# Patient Record
Sex: MALE | HISPANIC OR LATINO | URBAN - METROPOLITAN AREA
[De-identification: names, ages, dates, MRNs, and addresses within clinical notes are randomized per-mention and may not be internally consistent; named-entity substitution may affect disease eponyms.]

---

## 2021-11-23 ENCOUNTER — EMERGENCY (EMERGENCY)
Facility: HOSPITAL | Age: 46
LOS: 1 days | Discharge: HOME | End: 2021-11-23
Attending: EMERGENCY MEDICINE
Payer: MEDICARE

## 2021-11-23 VITALS
TEMPERATURE: 98 F | HEART RATE: 98 BPM | WEIGHT: 160.06 LBS | DIASTOLIC BLOOD PRESSURE: 90 MMHG | OXYGEN SATURATION: 98 % | HEIGHT: 64.96 IN | RESPIRATION RATE: 16 BRPM | SYSTOLIC BLOOD PRESSURE: 137 MMHG

## 2021-11-23 LAB
ALBUMIN SERPL ELPH-MCNC: 3.7 G/DL — SIGNIFICANT CHANGE UP (ref 3.5–5)
ALP SERPL-CCNC: 87 U/L — SIGNIFICANT CHANGE UP (ref 40–120)
ALT FLD-CCNC: 34 U/L DA — SIGNIFICANT CHANGE UP (ref 10–60)
ANION GAP SERPL CALC-SCNC: 7 MMOL/L — SIGNIFICANT CHANGE UP (ref 5–17)
AST SERPL-CCNC: 14 U/L — SIGNIFICANT CHANGE UP (ref 10–40)
BASOPHILS # BLD AUTO: 0.06 K/UL — SIGNIFICANT CHANGE UP (ref 0–0.2)
BASOPHILS NFR BLD AUTO: 0.7 % — SIGNIFICANT CHANGE UP (ref 0–2)
BILIRUB SERPL-MCNC: 0.2 MG/DL — SIGNIFICANT CHANGE UP (ref 0.2–1.2)
BUN SERPL-MCNC: 14 MG/DL — SIGNIFICANT CHANGE UP (ref 7–18)
CALCIUM SERPL-MCNC: 9.4 MG/DL — SIGNIFICANT CHANGE UP (ref 8.4–10.5)
CHLORIDE SERPL-SCNC: 105 MMOL/L — SIGNIFICANT CHANGE UP (ref 96–108)
CO2 SERPL-SCNC: 28 MMOL/L — SIGNIFICANT CHANGE UP (ref 22–31)
CREAT SERPL-MCNC: 0.93 MG/DL — SIGNIFICANT CHANGE UP (ref 0.5–1.3)
EOSINOPHIL # BLD AUTO: 0.18 K/UL — SIGNIFICANT CHANGE UP (ref 0–0.5)
EOSINOPHIL NFR BLD AUTO: 2.1 % — SIGNIFICANT CHANGE UP (ref 0–6)
GLUCOSE SERPL-MCNC: 111 MG/DL — HIGH (ref 70–99)
HCT VFR BLD CALC: 49.1 % — SIGNIFICANT CHANGE UP (ref 39–50)
HGB BLD-MCNC: 16 G/DL — SIGNIFICANT CHANGE UP (ref 13–17)
IMM GRANULOCYTES NFR BLD AUTO: 0.5 % — SIGNIFICANT CHANGE UP (ref 0–1.5)
LYMPHOCYTES # BLD AUTO: 1.62 K/UL — SIGNIFICANT CHANGE UP (ref 1–3.3)
LYMPHOCYTES # BLD AUTO: 19.3 % — SIGNIFICANT CHANGE UP (ref 13–44)
MAGNESIUM SERPL-MCNC: 2.3 MG/DL — SIGNIFICANT CHANGE UP (ref 1.6–2.6)
MCHC RBC-ENTMCNC: 26.9 PG — LOW (ref 27–34)
MCHC RBC-ENTMCNC: 32.6 GM/DL — SIGNIFICANT CHANGE UP (ref 32–36)
MCV RBC AUTO: 82.5 FL — SIGNIFICANT CHANGE UP (ref 80–100)
MONOCYTES # BLD AUTO: 0.62 K/UL — SIGNIFICANT CHANGE UP (ref 0–0.9)
MONOCYTES NFR BLD AUTO: 7.4 % — SIGNIFICANT CHANGE UP (ref 2–14)
NEUTROPHILS # BLD AUTO: 5.88 K/UL — SIGNIFICANT CHANGE UP (ref 1.8–7.4)
NEUTROPHILS NFR BLD AUTO: 70 % — SIGNIFICANT CHANGE UP (ref 43–77)
NRBC # BLD: 0 /100 WBCS — SIGNIFICANT CHANGE UP (ref 0–0)
PHOSPHATE SERPL-MCNC: 3 MG/DL — SIGNIFICANT CHANGE UP (ref 2.5–4.5)
PLATELET # BLD AUTO: 275 K/UL — SIGNIFICANT CHANGE UP (ref 150–400)
POTASSIUM SERPL-MCNC: 3.8 MMOL/L — SIGNIFICANT CHANGE UP (ref 3.5–5.3)
POTASSIUM SERPL-SCNC: 3.8 MMOL/L — SIGNIFICANT CHANGE UP (ref 3.5–5.3)
PROT SERPL-MCNC: 8.2 G/DL — SIGNIFICANT CHANGE UP (ref 6–8.3)
RBC # BLD: 5.95 M/UL — HIGH (ref 4.2–5.8)
RBC # FLD: 11.9 % — SIGNIFICANT CHANGE UP (ref 10.3–14.5)
SARS-COV-2 RNA SPEC QL NAA+PROBE: SIGNIFICANT CHANGE UP
SODIUM SERPL-SCNC: 140 MMOL/L — SIGNIFICANT CHANGE UP (ref 135–145)
WBC # BLD: 8.4 K/UL — SIGNIFICANT CHANGE UP (ref 3.8–10.5)
WBC # FLD AUTO: 8.4 K/UL — SIGNIFICANT CHANGE UP (ref 3.8–10.5)

## 2021-11-23 PROCEDURE — 36415 COLL VENOUS BLD VENIPUNCTURE: CPT

## 2021-11-23 PROCEDURE — 83735 ASSAY OF MAGNESIUM: CPT

## 2021-11-23 PROCEDURE — 93005 ELECTROCARDIOGRAM TRACING: CPT

## 2021-11-23 PROCEDURE — 99284 EMERGENCY DEPT VISIT MOD MDM: CPT

## 2021-11-23 PROCEDURE — 85025 COMPLETE CBC W/AUTO DIFF WBC: CPT

## 2021-11-23 PROCEDURE — 80053 COMPREHEN METABOLIC PANEL: CPT

## 2021-11-23 PROCEDURE — 84100 ASSAY OF PHOSPHORUS: CPT

## 2021-11-23 PROCEDURE — 87635 SARS-COV-2 COVID-19 AMP PRB: CPT

## 2021-11-23 PROCEDURE — 99283 EMERGENCY DEPT VISIT LOW MDM: CPT

## 2021-11-23 NOTE — ED PROVIDER NOTE - PATIENT PORTAL LINK FT
You can access the FollowMyHealth Patient Portal offered by [facility] by registering at the following website: [email or web address]. By joining [employer]’s FollowMyHealth portal, you will also be able to view your health information using other applications (apps) compatible with our system.

## 2021-11-23 NOTE — ED PROVIDER NOTE - CLINICAL SUMMARY MEDICAL DECISION MAKING FREE TEXT BOX
Patient reports paresthesias, anxiety, hyperventilation. All resolved. Possibly side effect of amlodipine. Will check labs and reassess.

## 2021-11-23 NOTE — ED PROVIDER NOTE - OBJECTIVE STATEMENT
Patient reports around 1pm today, he developed "chills" on the top of his head. Felt anxious but was able to move past the feeling in a few minutes. Around 4pm, he developed the "chills" on the top of his head again. Began to feel anxious, began hyperventilating. Felt tingling on the back of his neck and right 5th finger. lasted about 40 minutes. No cp, fever, cough, ap, n/v/d, SI/HI. Currently feels back to baseline. Patient reports he saw he PMD a month ago and was diagnosed with hypertension. Started on amlodipine. He read on medication information that it can cause panic attacks. patient has no h/o panic attacks.

## 2021-11-23 NOTE — ED ADULT NURSE NOTE - OBJECTIVE STATEMENT
Patient presents to ED complaining of feeling lightheaded, anxious, cramping of neck and shoulders, and legs. He states he started BP medications last week. He is in no acute distress, no respiratory distress noted. Patient presents to ED complaining of feeling lightheaded, anxious, cramping of neck and shoulders, and legs. He states he started BP medications last week. He is in no acute distress, no respiratory distress noted. Patient moves all extremities to resistance, and is ambulatory.

## 2021-11-23 NOTE — ED PROVIDER NOTE - NSFOLLOWUPINSTRUCTIONS_ED_ALL_ED_FT
Parestesia    Paresthesia    La parestesia es june sensación de ardor o picazón fuera de lo normal. Suele sentirse en las william, los brazos, las piernas o los pies. Sin embargo, puede manifestarse en cualquier parte del cuerpo. Por lo general, la parestesia no es dolorosa. Se puede sentir edwige lo siguiente:  •Hormigueo o entumecimiento.      •Vibración.      •Picazón.    La parestesia puede producirse sin causa aparente, o puede ser provocada por:  •Respirar demasiado rápido (hiperventilación).      •Presión en un nervio.      •June afección médica subyacente.      •Efectos secundarios de un medicamento.      •Deficiencias nutricionales.      •Exposición a sustancias químicas.      La mayoría de las personas sienten parestesia temporal en algún momento de la alfonso. Para algunas personas, puede ser a joey plazo (crónica) debido a june afección médica subyacente. Si tiene parestesia por mucho tiempo, posiblemente necesite june evaluación médica.      Sigue estas instrucciones en tu casa:      Consumo de alcohol    • No ranjan alcohol si:  •Iniguez médico le indica no hacerlo.      •Está embarazada, puede estar embarazada o está tratando de quedar embarazada.      •Si obdulia alcohol:•Limite la cantidad que obdulia:  •De 0 a 1 medida por día para las mujeres.      •De 0 a 2 medidas por día para los hombres.        •Esté atento a la cantidad de alcohol que hay en las bebidas que jam. En [place], june medida equivale a june botella de cerveza de 12 oz (355 ml), un vaso de vino de 5 oz (148 ml) o un vaso de june bebida alcohólica de huang graduación de 1½ oz (44 ml).          Nutrición    •Siga june dieta saludable. Hitchcock puede comprender lo siguiente:  •Consuma alimentos ricos en fibra, edwige frutas y verduras frescas, cereales integrales y frijoles.      •Limitar el consumo de alimentos ricos en grasas y azúcares procesados, edwige los alimentos fritos o dulces.        Indicaciones generales     •Jam los medicamentos de venta puma y los recetados solamente edwige se lo haya indicado el médico.      • No consuma ningún producto que contenga nicotina o tabaco, edwige cigarrillos y cigarrillos electrónicos. Estos productos evitan que la arianne llegue a los nervios dañados. Si necesita ayuda para dejar de consumir, consulta al médico.      •Si tiene diabetes, trabaje estrechamente con el médico para mantener bajo control el nivel de azúcar en la arianne.    •Si siente adormecimiento en los pies, kalyan lo siguiente:  •Realice controles todos los días para detectar signos de lesión o infección. Observe señales de enrojecimiento, calor e hinchazón.      •Use calcetines acolchados y zapatos cómodos. Estos ayudan a proteger los pies.        •Concurrir a todas las visitas de seguimiento edwige se lo haya indicado el médico. Hitchcock es importante.        Comuníquese con un médico si:    •Tiene parestesia que empeora o no desaparece.      •La sensación de ardor o picazón empeora al caminar.      •Tiene dolor, calambres o mareos.      •Aparece june erupción cutánea.        Solicite ayuda inmediatamente si:    •Se sienta débil.      •Tiene dificultad para caminar o moverse.      •Tiene problemas con el habla, la comprensión o la visión.      •Se siente confundido.      •No puede controlar la función de la vejiga o los intestinos.      •Siente adormecimiento después de june lesión.      •Comienza a tener debilidad en un brazo o june pierna.      •Se desmaya.        Resumen    •La parestesia es june sensación de ardor o picazón fuera de lo normal que generalmente se siente en las william, los brazos, las piernas o los pies. También pueden producirse en otras partes del cuerpo.      •La parestesia puede producirse sin causa aparente, o puede deberse a respirar muy rápidamente (hiperventilación), la presión en un nervio, june afección médica subyacente, los efectos secundarios de un medicamento, deficiencias nutricionales o exposición a sustancias tóxicas.      •Si tiene parestesia por mucho tiempo, posiblemente necesite june evaluación médica.      Esta información no tiene edwige fin reemplazar el consejo del médico. Asegúrese de hacerle al médico cualquier pregunta que tenga.      Document Revised: 01/14/2020 Document Reviewed: 01/14/2020    Elsevier Patient Education © 2021 [employer]

## 2021-11-23 NOTE — ED ADULT TRIAGE NOTE - CHIEF COMPLAINT QUOTE
BIBA c/o feeling light headed, cramping of the hands/fingers and numbness in his neck, no facial droop, slurred speech or unilateral weakness, pt recently started BP meds

## 2021-11-23 NOTE — ED PROVIDER NOTE - PROGRESS NOTE DETAILS
Patient is resting comfortably, NAD. Remains asymptomatic. Will switch antihypertensive to HCTZ. Patient to f/u with PMD next week. Return to the ED immediately if getting worse, not improving, or if having any new or troubling symptoms.

## 2021-11-23 NOTE — ED ADULT NURSE NOTE - CHPI ED NUR SYMPTOMS NEG
no blurred vision/no change in level of consciousness/no confusion/no fever/no loss of consciousness
